# Patient Record
Sex: FEMALE | URBAN - METROPOLITAN AREA
[De-identification: names, ages, dates, MRNs, and addresses within clinical notes are randomized per-mention and may not be internally consistent; named-entity substitution may affect disease eponyms.]

---

## 2022-04-30 ENCOUNTER — TELEPHONE ENCOUNTER (OUTPATIENT)
Dept: URBAN - METROPOLITAN AREA CLINIC 121 | Facility: CLINIC | Age: 67
End: 2022-04-30

## 2022-05-01 ENCOUNTER — TELEPHONE ENCOUNTER (OUTPATIENT)
Dept: URBAN - METROPOLITAN AREA CLINIC 121 | Facility: CLINIC | Age: 67
End: 2022-05-01

## 2022-05-01 RX ORDER — ESOMEPRAZOLE MAGNESIUM 20 MG
CAPSULE,DELAYED RELEASE (ENTERIC COATED) ORAL
Status: ACTIVE | COMMUNITY
Start: 2015-03-24

## 2022-05-01 RX ORDER — GABAPENTIN 300 MG/1
TID CAPSULE ORAL
Status: ACTIVE | COMMUNITY
Start: 2015-03-24

## 2022-05-01 RX ORDER — POLYETHYLENE GLYCOL 3350, SODIUM SULFATE, SODIUM CHLORIDE, POTASSIUM CHLORIDE, ASCORBIC ACID, SODIUM ASCORBATE 7.5-2.691G
MIX AND USE AS DIRECTED. MAY SUB FOR TRILYTE KIT ORAL
Status: ACTIVE | COMMUNITY
Start: 2015-03-24

## 2022-05-01 RX ORDER — ASPIRIN 325 MG/1
QD TABLET ORAL
Status: ACTIVE | COMMUNITY
Start: 2015-03-24

## 2022-05-01 RX ORDER — ROSUVASTATIN CALCIUM 20 MG
QD TABLET ORAL
Status: ACTIVE | COMMUNITY
Start: 2015-03-24

## 2022-05-01 RX ORDER — DILTIAZEM HYDROCHLORIDE 120 MG/1
QD CAPSULE, COATED, EXTENDED RELEASE ORAL
Status: ACTIVE | COMMUNITY
Start: 2015-03-24

## 2022-05-01 RX ORDER — METOPROLOL SUCCINATE 100 MG/1
QD TABLET, EXTENDED RELEASE ORAL
Status: ACTIVE | COMMUNITY
Start: 2015-03-24

## 2022-05-01 RX ORDER — ISOSORBIDE MONONITRATE 60 MG/1
QD TABLET, EXTENDED RELEASE ORAL
Status: ACTIVE | COMMUNITY
Start: 2015-03-24

## 2022-05-01 RX ORDER — OLMESARTAN MEDOXOMIL 20 MG/1
QD TABLET, FILM COATED ORAL
Status: ACTIVE | COMMUNITY
Start: 2015-03-24

## 2024-10-10 ENCOUNTER — OFFICE VISIT (OUTPATIENT)
Dept: URBAN - METROPOLITAN AREA CLINIC 84 | Facility: CLINIC | Age: 69
End: 2024-10-10
Payer: MEDICARE

## 2024-10-10 ENCOUNTER — LAB OUTSIDE AN ENCOUNTER (OUTPATIENT)
Dept: URBAN - METROPOLITAN AREA CLINIC 84 | Facility: CLINIC | Age: 69
End: 2024-10-10

## 2024-10-10 ENCOUNTER — TELEPHONE ENCOUNTER (OUTPATIENT)
Dept: URBAN - METROPOLITAN AREA CLINIC 84 | Facility: CLINIC | Age: 69
End: 2024-10-10

## 2024-10-10 ENCOUNTER — DASHBOARD ENCOUNTERS (OUTPATIENT)
Age: 69
End: 2024-10-10

## 2024-10-10 VITALS
DIASTOLIC BLOOD PRESSURE: 86 MMHG | TEMPERATURE: 97 F | HEIGHT: 67 IN | SYSTOLIC BLOOD PRESSURE: 129 MMHG | HEART RATE: 77 BPM | BODY MASS INDEX: 30.67 KG/M2 | WEIGHT: 195.4 LBS

## 2024-10-10 DIAGNOSIS — B18.2 HEP C W/O COMA, CHRONIC: ICD-10-CM

## 2024-10-10 DIAGNOSIS — Z95.0 S/P PLACEMENT OF CARDIAC PACEMAKER: ICD-10-CM

## 2024-10-10 DIAGNOSIS — Z86.0100 PERSONAL HISTORY OF COLONIC POLYPS: ICD-10-CM

## 2024-10-10 PROBLEM — 161692001: Status: ACTIVE | Noted: 2024-10-10

## 2024-10-10 PROCEDURE — 99204 OFFICE O/P NEW MOD 45 MIN: CPT | Performed by: INTERNAL MEDICINE

## 2024-10-10 RX ORDER — ROSUVASTATIN 20 MG/1
TAKE 1 TABLET BY MOUTH EVERY DAY TABLET, FILM COATED ORAL
Qty: 90 EACH | Refills: 0 | Status: ACTIVE | COMMUNITY

## 2024-10-10 RX ORDER — CARVEDILOL 6.25 MG/1
TAKE 1 TABLET BY MOUTH TWICE DAILY TABLET, FILM COATED ORAL
Qty: 180 EACH | Refills: 0 | Status: ACTIVE | COMMUNITY

## 2024-10-10 NOTE — PHYSICAL EXAM GASTROINTESTINAL
Abdomen , soft, nontender, nondistended , no guarding or rigidity , no masses palpable , normal bowel sounds , Liver and Spleen , no hepatomegaly present , no hepatosplenomegaly , liver nontender , spleen not palpable has "fluid around the heart" here for that last week now had echo done and worsening fluid noted.  Denies shortness of breath, but has some palpitations intermittently

## 2024-10-10 NOTE — HPI-TODAY'S VISIT:
This patient was referred by Dr. Anuja Perez for an evaluation of Hep C.  A copy of this will be sent to the referring provider..  She was diagnosed with Hep C about 25 eyars ago.  Per the patient, she was never treated.  She has nnot been checked for this in over 20 years.  She denies anroexia or weight loss.  She denies abdominal pain.  She denies UGI symptoms.  She denies LGI symptoms.  She denies LGI Bleed or melena.  Her last colonoscopy was in 2018.  She had a TA with a 5 year recall.

## 2024-10-16 LAB
AFP, SERUM: 2.8
AFP-L3%, SERUM: 16.7
ALBUMIN/GLOBULIN RATIO: 1.5
ALBUMIN: 4.5
ALKALINE PHOSPHATASE: 69
ALPHA 2 MACROGLOBULIN: 168
ALT (SGPT): 13
ALT: 12
APOLIPOPROTEIN A1: 179
AST (SGOT): 15
BILIRUBIN, DIRECT: 0.2
BILIRUBIN, INDIRECT: 0.8
BILIRUBIN, TOTAL: 1
FIBROSIS INTERPRETATION: (no result)
FIBROSIS SCORE: 0.13
FIBROSIS STAGE: (no result)
FOOTNOTE: (no result)
GGT: 15
GLOBULIN: 3
HAPTOGLOBIN: 242
HCV RNA, QUANTITATIVE: <1.18
HCV RNA, QUANTITATIVE: <15
HEPATITIS C VIRAL RNA: NOT DETECTED
NECROINFLAMMAT ACT GRADE: (no result)
NECROINFLAMMAT ACT SCORE: 0.03
NECROINFLAMMAT INTERP: (no result)
PROTEIN, TOTAL: 7.5
REFERENCE ID: (no result)
TOTAL BILIRUBIN: 1

## 2024-10-28 ENCOUNTER — TELEPHONE ENCOUNTER (OUTPATIENT)
Dept: URBAN - METROPOLITAN AREA MEDICAL CENTER 28 | Facility: MEDICAL CENTER | Age: 69
End: 2024-10-28

## 2024-10-28 ENCOUNTER — OFFICE VISIT (OUTPATIENT)
Dept: URBAN - METROPOLITAN AREA CLINIC 83 | Facility: CLINIC | Age: 69
End: 2024-10-28

## 2024-11-11 ENCOUNTER — CLAIMS CREATED FROM THE CLAIM WINDOW (OUTPATIENT)
Dept: URBAN - METROPOLITAN AREA CLINIC 4 | Facility: CLINIC | Age: 69
End: 2024-11-11
Payer: MEDICARE

## 2024-11-11 ENCOUNTER — CLAIMS CREATED FROM THE CLAIM WINDOW (OUTPATIENT)
Dept: URBAN - METROPOLITAN AREA SURGERY CENTER 20 | Facility: SURGERY CENTER | Age: 69
End: 2024-11-11
Payer: MEDICARE

## 2024-11-11 DIAGNOSIS — Z86.0101 HISTORY OF ADENOMATOUS POLYP OF COLON: ICD-10-CM

## 2024-11-11 DIAGNOSIS — Z86.0101 H/O ADENOMATOUS POLYP OF COLON: ICD-10-CM

## 2024-11-11 DIAGNOSIS — D12.2 BENIGN NEOPLASM OF ASCENDING COLON: ICD-10-CM

## 2024-11-11 DIAGNOSIS — D12.2 ADENOMA OF ASCENDING COLON: ICD-10-CM

## 2024-11-11 DIAGNOSIS — K57.30 DIVERTICULOSIS OF COLON: ICD-10-CM

## 2024-11-11 DIAGNOSIS — Z12.11 COLON CANCER SCREENING: ICD-10-CM

## 2024-11-11 PROCEDURE — 45385 COLONOSCOPY W/LESION REMOVAL: CPT | Performed by: INTERNAL MEDICINE

## 2024-11-11 PROCEDURE — 0529F INTRVL 3/>YR PTS CLNSCP DOCD: CPT | Performed by: INTERNAL MEDICINE

## 2024-11-11 PROCEDURE — 00811 ANES LWR INTST NDSC NOS: CPT | Performed by: NURSE ANESTHETIST, CERTIFIED REGISTERED

## 2024-11-11 PROCEDURE — 88305 TISSUE EXAM BY PATHOLOGIST: CPT | Performed by: PATHOLOGY

## 2024-11-11 RX ORDER — CARVEDILOL 6.25 MG/1
TAKE 1 TABLET BY MOUTH TWICE DAILY TABLET, FILM COATED ORAL
Qty: 180 EACH | Refills: 0 | Status: ACTIVE | COMMUNITY

## 2024-11-11 RX ORDER — ROSUVASTATIN 20 MG/1
TAKE 1 TABLET BY MOUTH EVERY DAY TABLET, FILM COATED ORAL
Qty: 90 EACH | Refills: 0 | Status: ACTIVE | COMMUNITY

## 2024-11-25 ENCOUNTER — OFFICE VISIT (OUTPATIENT)
Dept: URBAN - METROPOLITAN AREA CLINIC 83 | Facility: CLINIC | Age: 69
End: 2024-11-25

## 2024-11-25 RX ORDER — CARVEDILOL 6.25 MG/1
TAKE 1 TABLET BY MOUTH TWICE DAILY TABLET, FILM COATED ORAL
Qty: 180 EACH | Refills: 0 | Status: ACTIVE | COMMUNITY

## 2024-11-25 RX ORDER — ROSUVASTATIN 20 MG/1
TAKE 1 TABLET BY MOUTH EVERY DAY TABLET, FILM COATED ORAL
Qty: 90 EACH | Refills: 0 | Status: ACTIVE | COMMUNITY

## 2025-01-02 ENCOUNTER — TELEPHONE ENCOUNTER (OUTPATIENT)
Dept: URBAN - METROPOLITAN AREA MEDICAL CENTER 28 | Facility: MEDICAL CENTER | Age: 70
End: 2025-01-02

## 2025-01-06 ENCOUNTER — OFFICE VISIT (OUTPATIENT)
Dept: URBAN - METROPOLITAN AREA CLINIC 83 | Facility: CLINIC | Age: 70
End: 2025-01-06

## 2025-03-03 ENCOUNTER — OFFICE VISIT (OUTPATIENT)
Dept: URBAN - METROPOLITAN AREA CLINIC 83 | Facility: CLINIC | Age: 70
End: 2025-03-03
Payer: MEDICARE

## 2025-03-03 DIAGNOSIS — R93.2 ABNORMAL ULTRASOUND OF LIVER: ICD-10-CM

## 2025-03-03 PROCEDURE — 76705 ECHO EXAM OF ABDOMEN: CPT | Performed by: INTERNAL MEDICINE
